# Patient Record
Sex: FEMALE | Race: WHITE | Employment: OTHER | URBAN - METROPOLITAN AREA
[De-identification: names, ages, dates, MRNs, and addresses within clinical notes are randomized per-mention and may not be internally consistent; named-entity substitution may affect disease eponyms.]

---

## 2017-09-25 ENCOUNTER — HOSPITAL ENCOUNTER (OUTPATIENT)
Dept: PHYSICAL THERAPY | Age: 67
Discharge: HOME | End: 2017-09-25
Payer: MEDICARE

## 2017-09-25 VITALS
HEART RATE: 91 BPM | OXYGEN SATURATION: 94 % | HEIGHT: 61 IN | SYSTOLIC BLOOD PRESSURE: 140 MMHG | WEIGHT: 214 LBS | DIASTOLIC BLOOD PRESSURE: 72 MMHG | BODY MASS INDEX: 40.4 KG/M2 | RESPIRATION RATE: 18 BRPM

## 2017-09-25 PROCEDURE — G8991 OTHER PT/OT GOAL STATUS: HCPCS | Performed by: PHYSICAL THERAPIST

## 2017-09-25 PROCEDURE — 97530 THERAPEUTIC ACTIVITIES: CPT

## 2017-09-25 PROCEDURE — G8990 OTHER PT/OT CURRENT STATUS: HCPCS | Performed by: PHYSICAL THERAPIST

## 2017-09-25 PROCEDURE — 97162 PT EVAL MOD COMPLEX 30 MIN: CPT

## 2017-09-25 NOTE — PROGRESS NOTES
[address] [address]      INITIAL EVALUATION    NAME: Moises Hope AGE: 69 y.o. GENDER: female  DATE: 9/25/2017  REFERRING PHYSICIAN: Ember Campos MD  HISTORY AND BACKGROUND: The patient is a 69year old female with history of bilateral LE DVT 7 years ago who is referred to this clinic by her PCP for the evaluation of chronic LE edema/lymphedema that she has had since that time. She has stents in her iliac veins and an IVC filter implanted. She is on Eliquis, and is followed by Dr Frank Moctezuma (hemtology) who diagnosed her with a blood clotting disorder. The patient adds that her leg swelling has worsened over the past year because she has gained weight. Primary Diagnosis:  · BLE lymphedema, secondary (I89.0)    Other Treatment Diagnoses:   History of DVT of lower extremities (Z86.718  (Ataxic R26.0; paralytic R26.1; other abnormalities of gait and mobility R26.89)   Swelling not relieved by elevation (R60.0 localized edema, R60.1 generalized edema, R60.9 edema)   Venous insufficiency I87.2;   Date of Onset: DVT in LE 7 years ago with PE, 4 stents put in iliac vein and IVC filter implanted.     Present Symptoms and Functional Limitations: LE swelling, L > R as a result of blood clots in her legs that causes limbs to feel heavy and tight    Past Medical History:   Past Medical History:   Diagnosis Date    Blood clot in the legs     in leg and pelvic area    Constipation     Hair thinning     Ill-defined condition     hypercholesterolemia    Other ill-defined conditions(799.89) 2010    venous thrombosis    Other ill-defined conditions(799.89) 2010    pulmunory emboli    Psychiatric disorder     depression    Skin cancer     Stress     Sun-damaged skin     Thyroid disorder      Past Surgical History:   Procedure Laterality Date    AMB POC MOHS 1 STAGE H/N/HF/G      BREAST SURGERY PROCEDURE UNLISTED Bilateral     breast biopsy    CYSTOSCOPY      HX BUNIONECTOMY  2009    hammertoe repair    HX HYSTERECTOMY  1995    left ovary    HX ORTHOPAEDIC      bunionectomy    STENT INSERTION  Jult 2010    four stents and a filter    VASCULAR SURGERY PROCEDURE UNLIST      left vein stripping    VASCULAR SURGERY PROCEDURE UNLIST      IVC filter placement    VASCULAR SURGERY PROCEDURE UNLIST      stents patient not sure where    XR RETROGRADE PYELOGRAM       Current Medications:    Current Outpatient Prescriptions   Medication Sig    apixaban (ELIQUIS) 2.5 mg tablet Take 2.5 mg by mouth two (2) times a day.  SPIRONOLACTONE PO Take  by mouth.  bupivacaine-EPINEPHrine (MARCAINE-EPINEPHRINE) 0.25 %-1:200,000 soln Used for mohs surgery  Indications: LOCAL ANESTHESIA FOR PROCEDURES    enoxaparin (LOVENOX) 40 mg/0.4 mL 40 mg by SubCUTAneous route daily.  levothyroxine (SYNTHROID) 50 mcg tablet Take 1 Tab by mouth Daily (before breakfast).  buPROPion XL (WELLBUTRIN XL) 150 mg tablet Take 1 Tab by mouth every morning.  WARFARIN SODIUM (WARFARIN PO) Take  by mouth. No current facility-administered medications for this encounter. Allergies: Allergies   Allergen Reactions    Pcn [Penicillins] Swelling      Prior Level of Function/Social/Work History/Personal factors and/or comorbidities impacting plan of care: independent. Patient reports she has recently gained a lot of weight which has made her LE swelling worse. She is . Living Situation: single family home.   However, the patient is going to be with her  at [facility] for a large part of October 2017 because he is undergoing cancer treatment there      Trainable Caregiver?: no   Self-care/ADLs: independent     Compression/Lymphedema Equipment:  Sigvaris 232CLSW/S (20-30 mmHg) knee length    SUBJECTIVE:     Patients goals for therapy: :I would like to know if there is anything that can be done for my swelling. \"    OBJECTIVE DATA SUMMARY:   EXAMINATION/PRESENTATION/DECISION MAKING:   Pain:  Pain Scale 1: Numeric (0 - 10)  Pain Intensity 1: 0     Self-Care and ADLs: independent  Skin and Tissue Assessment:  Dermal Status:  ( )  Intact (x )  Dry   ( )  Tenuous ( )  Flaky   ( )  Wound/lesion present ( )  Scars   ( )  Dermatitis    Texture/Consistency:  ( )  Boggy ( )  Pitting Edema   ( )  Brawny ( x)  Combination   ( x)  Fibrotic Left lower leg    Pigmentation/Color Change:  ( )  Normal ( )  Hemosiderin   ( )  Red ( )  Erythematous   ( x)  Hyperpigmented ( )  Hyperlipodermatosclerosis   Anomalies:  ( )  Lymphorrhea ( )  Vesicles   ( )  Petechiae ( )  Warty Vercusis   ( )  Bullae ( )  Papilloma   Circulatory:  ( )  CARLOS ( )  Varicosities:   ( )  Pulses ( )  Vascular studies ruled out DVT in past   ( x)  DVT History    Nails:  (x )  Normal  ( )  Fungus  Stemmers Sign: negative  Height:  Height: 5' 1\" (154.9 cm)  Weight:  Weight: 97.1 kg (214 lb)   BMI:  BMI (calculated): 40.5  (36 or greater: adversely affecting lymphedema): Volumetric Measurements:   Right:  11,400 mL Left:  13,994 mL   % Difference: 22.75% Dominance:   (See scanned graph)  Range of Motion: Bilateral LE ROM WNL  Strength: bilateral LE MMY 5/5  Sensation:  Bilateral LE dermatomes intact light touch  Transfers: Independent  Gait: independent  Safety: Patient is A & O x 4  Precautions:  Standard lymphedema precautions to include avoiding blood pressure readings, injections and IVs or other procedures/acts that could lead to broken skin on affected area, and avoiding excessive heat, resistive activity or altitude without compression garment    Functional Measure: In compliance with CMSs Claims Based Outcome Reporting, the following G-code set was chosen for this patient based on their primary functional limitation being treated:     The outcome measure chosen to determine the severity of the functional limitation was the Lymphedema Life Impact Scale with a score of 18/68 which was correlated with the impairment scale. ? Other PT/OT Primary Functional Limitations:     - CURRENT STATUS: CJ - 20%-39% impaired, limited or restricted    - GOAL STATUS: CI - 1%-19% impaired, limited or restricted    - D/C STATUS:  ---------------To be determined---------------       Physical Therapy Evaluation Charge Determination   History Examination Presentation Decision-Making   MEDIUM  Complexity : 1-2 comorbidities / personal factors will impact the outcome/ POC  MEDIUM Complexity : 3 Standardized tests and measures addressing body structure, function, activity limitation and / or participation in recreation  MEDIUM Complexity : Evolving with changing characteristics  Other outcome measures LLIS   MEDIUM      Based on the above components, the patient evaluation is determined to be of the following complexity level: MEDIUM    Evaluation Time: 10:00 - 10:30    TREATMENT PROVIDED:   1. Treatment description:  The patient was educated regarding the role and function of the lymphatic system, and instructed in the lymphedema management protocol of complete decongestive therapy (CDT). This includes skin care to prevent breakdown or infection, lymphedema exercises, ready-made and custom compression therapy options (bandaging, compression garments, compression pump, Jennifer Parkers, JoViPak, Falguni Scriver, etc. as needed), and decongestion with manual lymphatic drainage as indicated. We discussed the need for consistent compression system for lymphedema management. Diaphragmatic breathing instruction and skin care education was performed,applying low pH lotion to extremity using upward strokes to stimulate lymphatic vessels.  We discussed that because she is going to be out of town a lot over the next month or two with her  who is receiving cancer treatment at [facility], that it would probably make sense to defer starting treatment until his care is completed. The patient was agreeable to this. Treatment time:  10:30 - 11:00  Minutes: 30 (Therapeutic Activity x 2)        ASSESSMENT:   Theberlin Mcginnis is a 69 y.o. female who presents with stage 2 secondary lymphedema due to a history of bilateral LE DVT with resulting venous insufficiency. Patient will benefit from complete decongestive therapy (CDT) including manual lymphatic drainage (MLD) technique, short-stretch textile bandages/compression system to decongest limb, and kinesiotaping as appropriate. Patient will receive instruction in proper skin care to recognize signs/symptoms of and prevent infection, therapeutic exercise, and self-MLD for independent home program and restorative lymphatic performance. We will defer starting treatment until December 2017 or January 2018 because the patient is going to be out of town a lot over the next few months with her  who is ill and is receiving medical care at [facility]    This care is medically necessary due to the infection risk with lymphedema, and to improve functional activities. CDT is necessary to resolve swelling to allow patient to return to wearing normal clothes/footwear, and prevent worsening of symptoms, such as venous stasis ulcerations, infections, or hospitalizations. Patient will be independent with home program strategies to allow improved ADL ability and mobility and to allow patient to return to greatest functional independence. Rehabilitation potential is considered to be Fair. Factors which may influence rehabilitation potential include coordinating PT around her 's medical needs. Patient will benefit from 12-16 prn physical therapy visits over 12 weeks to optimize improvement in these areas.     PLAN OF CARE:   Recommendations and Planned Interventions:  Manual lymph drainage/complete decongestive therapy  Multi-layer compression bandaging (short-stretch)  Compression garment fitting/provision  Lymphedema therapeutic exercise  AROM/PROM/Strength/Coordination  Self-care training  Functional mobility training  Education in skin care and lymphedema precautions  Self-MLD education per home program  Self-bandaging education per home program  Caregiver education as needed  Wound care as needed     GOALS  STG 6 weeks  1. Instruct the patient to be independent with proper skin care to prevent future skin breakdown and decrease the potential risk for infections that are associated with Lymphedema. 2. Patient will be independent with a personal lymphedema exercise program to assist with the lymphatic flow and reduce limb volume. 3. Patient will understand the signs and symptoms of acute infection. LTG 12 weeks  1. Patient will have knowledge of the compression options and acquire a safe and   appropriate daytime and night time compression system to prevent    re-accumulation of fluid. 2.  Patient or family will be able to don/doff garments independently. Garment   system effectiveness will be evaluated prior to discharge for better long-term   management and outcomes. 3.   To transition patient to independent restorative phase of CDT. Patient has participated in goal setting and agrees to work toward plan of care. Patient was instructed to call if questions or concerns arise. Thank you for this referral.  Mike Holloway, PT, CLT   Time Calculation: 90 mins    TREATMENT PLAN EFFECTIVE DATES:   9/25/2017 TO 12/25/2017  I have read the above plan of care for Keya Diop. I certify the above prescribed services are required by this patient and are medically necessary.   The above plan of care has been developed in conjunction with the lymphedema/physical therapist.       Physician Signature: ____________________________________Date:______________

## 2019-03-27 ENCOUNTER — ED HISTORICAL/CONVERTED ENCOUNTER (OUTPATIENT)
Dept: OTHER | Age: 69
End: 2019-03-27